# Patient Record
Sex: MALE | Race: WHITE
[De-identification: names, ages, dates, MRNs, and addresses within clinical notes are randomized per-mention and may not be internally consistent; named-entity substitution may affect disease eponyms.]

---

## 2018-10-18 ENCOUNTER — HOSPITAL ENCOUNTER (OUTPATIENT)
Dept: HOSPITAL 92 - TBSIIMAG | Age: 58
Discharge: HOME | End: 2018-10-18
Attending: NEUROLOGICAL SURGERY
Payer: MEDICARE

## 2018-10-18 DIAGNOSIS — G31.9: ICD-10-CM

## 2018-10-18 DIAGNOSIS — S06.5X0A: Primary | ICD-10-CM

## 2018-10-18 PROCEDURE — 70450 CT HEAD/BRAIN W/O DYE: CPT

## 2018-10-18 NOTE — CT
CT BRAIN:

 

HISTORY: 

Patient with subdural hematoma, drain removal.

 

FINDINGS: 

Noncontrast-enhanced CT images of the brain were obtained on 10/18/2018.  Comparison is made to previ
ous exam from 9/22/2018.

 

Noncontrast-enhanced CT brain demonstrates extensive right hemispheric encephalomalacic changes.  Pre
viously noted right-to-left shift has resolved.  No evidence of significant subdural hematoma seen.  
There is extensive right hemispheric atrophy.  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  

 

IMPRESSION: 

Extensive right hemispheric atrophy.  No acute intracranial abnormality is seen.  There has been reso
lution of previously noted right-to-left shift seen on previous CT from September of 2010.  Unfortuna
tely, more recent CT image is not available.  

 

POS: [facility]